# Patient Record
Sex: FEMALE | Race: BLACK OR AFRICAN AMERICAN | URBAN - METROPOLITAN AREA
[De-identification: names, ages, dates, MRNs, and addresses within clinical notes are randomized per-mention and may not be internally consistent; named-entity substitution may affect disease eponyms.]

---

## 2017-05-19 ENCOUNTER — HISTORICAL (OUTPATIENT)
Dept: ADMINISTRATIVE | Facility: HOSPITAL | Age: 27
End: 2017-05-19

## 2017-05-19 LAB
ALBUMIN SERPL-MCNC: 3.9 GM/DL (ref 3.4–5)
ALBUMIN/GLOB SERPL: 1 RATIO (ref 1–2)
ALP SERPL-CCNC: 71 UNIT/L (ref 20–120)
ALT SERPL-CCNC: 16 UNIT/L
AST SERPL-CCNC: 22 UNIT/L
BILIRUB SERPL-MCNC: 0.2 MG/DL
BILIRUBIN DIRECT+TOT PNL SERPL-MCNC: <0.1 MG/DL
BILIRUBIN DIRECT+TOT PNL SERPL-MCNC: >0.1 MG/DL
BUN SERPL-MCNC: 11 MG/DL (ref 7–25)
CALCIUM SERPL-MCNC: 9.2 MG/DL (ref 8.4–10.3)
CHLORIDE SERPL-SCNC: 106 MMOL/L (ref 96–110)
CO2 SERPL-SCNC: 26 MMOL/L (ref 24–32)
CREAT SERPL-MCNC: 0.6 MG/DL (ref 0.7–1.1)
ERYTHROCYTE [DISTWIDTH] IN BLOOD BY AUTOMATED COUNT: 15.4 % (ref 11.5–14.5)
GLOBULIN SER-MCNC: 3.8 GM/ML (ref 2.3–3.5)
GLUCOSE SERPL-MCNC: 102 MG/DL (ref 65–99)
HCT VFR BLD AUTO: 37.7 % (ref 35–46)
HGB BLD-MCNC: 12.1 GM/DL (ref 12–16)
MCH RBC QN AUTO: 26.8 PG (ref 26–34)
MCHC RBC AUTO-ENTMCNC: 32.1 GM/DL (ref 31–37)
MCV RBC AUTO: 83.4 FL (ref 80–100)
PLATELET # BLD AUTO: 442 X10(3)/MCL (ref 130–400)
PMV BLD AUTO: 9.9 FL (ref 7.4–10.4)
POTASSIUM SERPL-SCNC: 3.8 MMOL/L (ref 3.6–5.2)
PROT SERPL-MCNC: 7.7 GM/DL (ref 6–8)
RBC # BLD AUTO: 4.52 X10(6)/MCL (ref 4–5.2)
SODIUM SERPL-SCNC: 139 MMOL/L (ref 135–146)
WBC # SPEC AUTO: 10 X10(3)/MCL (ref 4.5–11)

## 2017-05-26 ENCOUNTER — HISTORICAL (OUTPATIENT)
Dept: SURGERY | Facility: HOSPITAL | Age: 27
End: 2017-05-26

## 2017-05-26 LAB — B-HCG SERPL QL: NEGATIVE

## 2017-09-21 ENCOUNTER — HISTORICAL (OUTPATIENT)
Dept: OCCUPATIONAL THERAPY | Facility: HOSPITAL | Age: 27
End: 2017-09-21

## 2017-10-03 ENCOUNTER — HISTORICAL (OUTPATIENT)
Dept: OCCUPATIONAL THERAPY | Facility: HOSPITAL | Age: 27
End: 2017-10-03

## 2017-10-05 ENCOUNTER — HISTORICAL (OUTPATIENT)
Dept: OCCUPATIONAL THERAPY | Facility: HOSPITAL | Age: 27
End: 2017-10-05

## 2017-10-10 ENCOUNTER — HISTORICAL (OUTPATIENT)
Dept: OCCUPATIONAL THERAPY | Facility: HOSPITAL | Age: 27
End: 2017-10-10

## 2017-10-12 ENCOUNTER — HISTORICAL (OUTPATIENT)
Dept: OCCUPATIONAL THERAPY | Facility: HOSPITAL | Age: 27
End: 2017-10-12

## 2017-10-17 ENCOUNTER — HISTORICAL (OUTPATIENT)
Dept: OCCUPATIONAL THERAPY | Facility: HOSPITAL | Age: 27
End: 2017-10-17

## 2017-10-19 ENCOUNTER — HISTORICAL (OUTPATIENT)
Dept: OCCUPATIONAL THERAPY | Facility: HOSPITAL | Age: 27
End: 2017-10-19

## 2017-10-23 ENCOUNTER — HISTORICAL (OUTPATIENT)
Dept: OCCUPATIONAL THERAPY | Facility: HOSPITAL | Age: 27
End: 2017-10-23

## 2017-10-24 ENCOUNTER — HISTORICAL (OUTPATIENT)
Dept: OCCUPATIONAL THERAPY | Facility: HOSPITAL | Age: 27
End: 2017-10-24

## 2022-04-09 ENCOUNTER — HISTORICAL (OUTPATIENT)
Dept: ADMINISTRATIVE | Facility: HOSPITAL | Age: 32
End: 2022-04-09

## 2022-04-25 VITALS
WEIGHT: 198.88 LBS | BODY MASS INDEX: 30.14 KG/M2 | SYSTOLIC BLOOD PRESSURE: 103 MMHG | HEIGHT: 68 IN | DIASTOLIC BLOOD PRESSURE: 72 MMHG

## 2022-04-30 NOTE — H&P
* Final Report *   Document Contains Addenda  Chief Complaint Referral for re-occurring pionidal cyst History of Present Illness Ms Antony is a 25 y/o F w/ pmhx of pilonidal disease who presents to surgery clinic for definitive management. She states that she has had multiple flares of her disease in the past, 2 of which requires PO abx and needle drainage of the cyst in the ED. Review of Systems Negative except as noted in the HPI Physical Exam   Vitals & Measurements T: 37.1 °C (Oral) RR: 20 BP: 114/80 HT: 172.72 cm HT: 172.72 cm WT: 91.8 kg WT: 91.8 kg BMI: 30.77 GEN: NAD  Chest/Resp: Lungs CTAB  CVS: Reg rate, no m/r/g  Abd: +BS, soft, ND/NT, no m/r/g  Back: superomedial margin of right buttock has 2x2cm palpable lesion below the skin that is ttp, nonerythematous. No obvious pore or hair protruding from the skin present. Assessment/Plan Ms Antony is a 25 y/o F who presents to clinic for definitive management of her pilonidal disease. Pt scheduled for excision of pilonidal sinus vs shahnaz flap on May 26, 2017    Des Hinojosa IV, MD Problem List/Past Medical History Obesity Tobacco user Tobacco user Tobacco user Historical   No historical problems Procedure/Surgical History Incision and drainage, perianal abscess, superficial (03/09/2017), Incision and drainage of abscess (eg, carbuncle, suppurative hidradenitis, cutaneous or subcutaneous abscess, cyst, furuncle, or paronychia); simple or single (07/11/2016). Medications HYDROCO/APAP TAB 5-325MG Implanon 68 mg subcutaneous implant, 68 mg, Subcutaneous, Once METHOCARBAM TAB 500MG, 500 mg, 1 tab(s), Oral, TID PANTOPRAZOLE TAB 40MG, 40 mg, 1 tab(s), Oral, Daily SERTRALINE TAB 50MG, 50 mg, 1 tab(s), Oral, Daily Allergies traMADol Social History   Alcohol   Current, Wine, 1-2 times per month   Substance Abuse   Tobacco   Current every day smoker, Cigarettes, 5 per day. Started age 18.0 Years. Previous treatment: None. Ready to change: Yes.    Addendum by Nehal  MD, Bennett FLETCHER on April 27, 2017 15:50:40 CDT (Verified)  I reviewed the patient's medical history, resident's findings on physical exam, the diagnosis and treatment plan. Care provided was reasonable and necessary.  Result type: Office/Clinic Note-Physician   Result date: April 27, 2017 14:53 CDT   Result status: Modified   Result title: [facility]   Performed by: St. Jaden ROSSI MD, Luther on April 27, 2017 15:00 CDT   Verified by: St. Jaden ROSSI MD, Luther on April 27, 2017 15:00 CDT   Encounter info: [id number], [facility], Clinic Visit, 4/27/2017 - 4/27/2017   Doc has been moved by HIM specialist.

## 2022-04-30 NOTE — OP NOTE
DATE OF SURGERY:    05/26/2017    ATTENDING SURGEON:  Bennett Calvert MD    RESIDENT SURGEON:  Amaya Saunders MD, PGY1.    :  Theresa Martinez MD, PGY5.    PREOPERATIVE DIAGNOSIS:  Pilonidal cyst.    POSTOPERATIVE DIAGNOSIS:  Pilonidal cyst.    PROCEDURE PERFORMED:  Excision of pilonidal cyst.    INDICATION:  The patient is a 26-year-old female who has a history of an infected pilonidal cyst.  She was first evaluated while she was pregnant and told to return to Surgery Clinic following her delivery.  She has had multiple I&Ds of this infection multiple times in the emergency department.  She elected to undergo definitive excision of the cyst.    PROCEDURE IN DETAIL:  After appropriate informed consent had been obtained, and all questions had been answered, the patient was brought to the operating room.  She was on the stretcher in supine position.  General endotracheal anesthesia was induced without complication.  She was then transferred to the operating table and placed in a prone position.  The area overlying the sacrum was prepped with Betadine and draped in the standard surgical fashion.  A time-out was performed verifying correct patient and procedure.  An elliptical skin incision was made around what appeared to be the area of chronic infection using a #15 blade scalpel.  There was no obvious midline disease; no pores or sinus tracts visible.  The area of chronic inflammation was just right of the midline.  This elliptical incision was carried down through the subcutaneous tissue using Bovie electrocautery.  This was carried down until healthy fat was encountered below.  It was then amputated at its base to excise all diseased tissue.  The cyst, including skin and surrounding tissue, was passed off the table as specimen.  Hemostasis was assured using Bovie electrocautery.  The midline was thoroughly inspected from within the cavity and, again, there were no obvious areas of disease  present.  We could not identify any areas of infection or any sinus tracts.  The open wound was thoroughly irrigated with normal saline, and again hemostasis was checked.  The wound was found to be hemostatic.  The wound was then closed in layers.  The subcutaneous tissue was closed with 2-0 Vicryl in an interrupted fashion.  The skin was then closed with 3-0 nylon in a vertical mattress fashion.  Sterile surgical dressings were applied.       The patient tolerated the procedure well without any immediate complication, and was transferred to the PACU in stable condition.    FINDINGS:  Area of chronic infection just right of the midline, no evidence of midline pilonidal disease.    BLOOD LOSS:  5 mL.    SPECIMEN:  Pilonidal cyst.    COMPLICATIONS:  None.        ______________________________  GALLO SANDHU MD    ______________________________  MD ENMANUEL Shaw/JOCE  DD:  05/26/2017  Time:  08:19AM  DT:  05/26/2017  Time:  09:09AM  Job #:  [id number]

## 2022-05-02 NOTE — HISTORICAL [facility] CERNER
This is a historical note converted from Matt. Formatting and pictures may have been removed.  Please reference [facility] for original formatting and attached multimedia. I have reviewed the history and examined the patient. There are no changes to the history & physical documented by Dr. Hinojosa on 4/27/17  ?  -To OR for excision of pilonidal cyst  ?  Amaya Saunders MD  [facility] General Surgery PGY-1